# Patient Record
Sex: MALE | URBAN - METROPOLITAN AREA
[De-identification: names, ages, dates, MRNs, and addresses within clinical notes are randomized per-mention and may not be internally consistent; named-entity substitution may affect disease eponyms.]

---

## 2017-07-26 ENCOUNTER — APPOINTMENT (RX ONLY)
Dept: URBAN - METROPOLITAN AREA CLINIC 158 | Facility: CLINIC | Age: 47
Setting detail: DERMATOLOGY
End: 2017-07-26

## 2017-07-26 DIAGNOSIS — L29.89 OTHER PRURITUS: ICD-10-CM

## 2017-07-26 DIAGNOSIS — L30.2 CUTANEOUS AUTOSENSITIZATION: ICD-10-CM

## 2017-07-26 DIAGNOSIS — B35.3 TINEA PEDIS: ICD-10-CM

## 2017-07-26 PROBLEM — L29.8 OTHER PRURITUS: Status: ACTIVE | Noted: 2017-07-26

## 2017-07-26 PROCEDURE — 87220 TISSUE EXAM FOR FUNGI: CPT

## 2017-07-26 PROCEDURE — ? COUNSELING

## 2017-07-26 PROCEDURE — ? TREATMENT REGIMEN

## 2017-07-26 PROCEDURE — 99242 OFF/OP CONSLTJ NEW/EST SF 20: CPT

## 2017-07-26 PROCEDURE — ? PRESCRIPTION

## 2017-07-26 PROCEDURE — ? KOH PREP

## 2017-07-26 RX ORDER — HYDROXYZINE HYDROCHLORIDE 25 MG/1
TABLET, FILM COATED ORAL QHS
Qty: 60 | Refills: 5 | Status: ERX | COMMUNITY
Start: 2017-07-26

## 2017-07-26 RX ORDER — TRIAMCINOLONE ACETONIDE 1 MG/G
CREAM TOPICAL BID
Qty: 80 | Refills: 5 | Status: ERX | COMMUNITY
Start: 2017-07-26

## 2017-07-26 RX ORDER — KETOCONAZOLE 20 MG/G
CREAM TOPICAL BID
Qty: 60 | Refills: 1 | Status: ERX | COMMUNITY
Start: 2017-07-26

## 2017-07-26 RX ORDER — TERBINAFINE HCL 250 MG
TABLET ORAL
Qty: 14 | Refills: 0 | Status: ERX | COMMUNITY
Start: 2017-07-26

## 2017-07-26 RX ADMIN — Medication: at 15:35

## 2017-07-26 RX ADMIN — KETOCONAZOLE: 20 CREAM TOPICAL at 15:35

## 2017-07-26 RX ADMIN — TRIAMCINOLONE ACETONIDE: 1 CREAM TOPICAL at 15:36

## 2017-07-26 RX ADMIN — HYDROXYZINE HYDROCHLORIDE: 25 TABLET, FILM COATED ORAL at 15:37

## 2017-07-26 ASSESSMENT — LOCATION SIMPLE DESCRIPTION DERM
LOCATION SIMPLE: LEFT GREAT TOE
LOCATION SIMPLE: RIGHT FOOT
LOCATION SIMPLE: RIGHT FOREARM
LOCATION SIMPLE: LEFT FOOT
LOCATION SIMPLE: LEFT FOREARM

## 2017-07-26 ASSESSMENT — LOCATION ZONE DERM
LOCATION ZONE: TOE
LOCATION ZONE: ARM
LOCATION ZONE: FEET

## 2017-07-26 ASSESSMENT — LOCATION DETAILED DESCRIPTION DERM
LOCATION DETAILED: LEFT DORSAL FOOT
LOCATION DETAILED: LEFT VENTRAL PROXIMAL FOREARM
LOCATION DETAILED: LEFT PROXIMAL DORSAL FOREARM
LOCATION DETAILED: RIGHT VENTRAL PROXIMAL FOREARM
LOCATION DETAILED: LEFT LATERAL GREAT TOE
LOCATION DETAILED: 1ST WEBSPACE RIGHT FOOT
LOCATION DETAILED: RIGHT PROXIMAL RADIAL DORSAL FOREARM
LOCATION DETAILED: RIGHT DORSAL FOOT

## 2017-07-26 NOTE — PROCEDURE: KOH PREP
Koh Intro Text (From The.....): A KOH prep was ordered and evaluated from the
Detail Level: Detailed
Showing: budding yeast and branching hyphae

## 2017-07-26 NOTE — PROCEDURE: TREATMENT REGIMEN
Initiate Treatment: TAC 0.1% cream bid
Plan: Pt to f/u in 1 month for further eval and management
Detail Level: Zone
Plan: Pt to f/u in 1 months for further eval and management
Initiate Treatment: Terbinafine 250mg qday x 14 days, ketoconazole cream % bid
Continue Regimen: fexo 180mg( pt has at home), hydroxy 25mg qhs(1-2tabs at bedtime)
Plan: Pt to f/u in 1 month for further eval and management. If pt calls with no improvement another course of terbinafine can be sent in

## 2017-08-17 ENCOUNTER — RX ONLY (OUTPATIENT)
Age: 47
Setting detail: RX ONLY
End: 2017-08-17

## 2017-08-17 RX ORDER — TERBINAFINE HCL 250 MG
TABLET ORAL
Qty: 14 | Refills: 0 | Status: ERX

## 2017-09-06 ENCOUNTER — APPOINTMENT (RX ONLY)
Dept: URBAN - METROPOLITAN AREA CLINIC 158 | Facility: CLINIC | Age: 47
Setting detail: DERMATOLOGY
End: 2017-09-06

## 2017-09-06 DIAGNOSIS — L30.2 CUTANEOUS AUTOSENSITIZATION: ICD-10-CM | Status: RESOLVED

## 2017-09-06 DIAGNOSIS — L29.89 OTHER PRURITUS: ICD-10-CM | Status: IMPROVED

## 2017-09-06 DIAGNOSIS — R21 RASH AND OTHER NONSPECIFIC SKIN ERUPTION: ICD-10-CM | Status: INADEQUATELY CONTROLLED

## 2017-09-06 PROBLEM — L29.8 OTHER PRURITUS: Status: ACTIVE | Noted: 2017-09-06

## 2017-09-06 PROCEDURE — 11100: CPT

## 2017-09-06 PROCEDURE — 99213 OFFICE O/P EST LOW 20 MIN: CPT | Mod: 25

## 2017-09-06 PROCEDURE — ? KOH PREP

## 2017-09-06 PROCEDURE — 87220 TISSUE EXAM FOR FUNGI: CPT

## 2017-09-06 PROCEDURE — ? COUNSELING

## 2017-09-06 PROCEDURE — ? TREATMENT REGIMEN

## 2017-09-06 PROCEDURE — ? BIOPSY BY SHAVE METHOD

## 2017-09-06 ASSESSMENT — LOCATION DETAILED DESCRIPTION DERM
LOCATION DETAILED: LEFT VENTRAL PROXIMAL FOREARM
LOCATION DETAILED: LEFT DORSAL FOOT
LOCATION DETAILED: RIGHT VENTRAL PROXIMAL FOREARM
LOCATION DETAILED: LEFT LATERAL DORSAL FOOT

## 2017-09-06 ASSESSMENT — LOCATION ZONE DERM
LOCATION ZONE: FEET
LOCATION ZONE: ARM

## 2017-09-06 ASSESSMENT — LOCATION SIMPLE DESCRIPTION DERM
LOCATION SIMPLE: LEFT FOOT
LOCATION SIMPLE: LEFT FOREARM
LOCATION SIMPLE: RIGHT FOREARM

## 2017-09-06 NOTE — PROCEDURE: TREATMENT REGIMEN
Plan: Further POC after biopsy results are reveiwed.  Pt to f/u as needed until bx results are received.
Detail Level: Zone
Continue Regimen: ketoconazole cream
Discontinue Regimen: TAC cream- restart as needed for flaring
Continue Regimen: take fexofenadine 180mg po every morning and Hydroxyzine 25mg po at bedtime

## 2017-09-06 NOTE — PROCEDURE: BIOPSY BY SHAVE METHOD
Anesthesia Type: 1% lidocaine without epinephrine
Curettage Text: The wound bed was treated with curettage after the biopsy was performed.
Biopsy Method: Dermablade
Billing Type: Third-Party Bill
Consent: Written consent was obtained and risks were reviewed including but not limited to scarring, infection, bleeding, scabbing, incomplete removal, nerve damage and allergy to anesthesia.
Electrodesiccation And Curettage Text: The wound bed was treated with electrodesiccation and curettage after the biopsy was performed.
Additional Anesthesia Volume In Cc (Will Not Render If 0): 0
Render Post-Care Instructions In Note?: yes
Electrodesiccation Text: The wound bed was treated with electrodesiccation after the biopsy was performed.
Bill For Surgical Tray: no
Biopsy Type: H and E
Wound Care: Vaseline
Hemostasis: Richard's
Type Of Destruction Used: Curettage
Post-Care Instructions: I reviewed with the patient in detail post-care instructions. Patient is to keep the biopsy site dry overnight, and then apply bacitracin twice daily until healed. Patient may apply hydrogen peroxide soaks to remove any crusting.
Dressing: bandage
Detail Level: Detailed
Anesthesia Volume In Cc: 0.5
Cryotherapy Text: The wound bed was treated with cryotherapy after the biopsy was performed.
Silver Nitrate Text: The wound bed was treated with silver nitrate after the biopsy was performed.
Notification Instructions: Patient will be notified of biopsy results. However, patient instructed to call the office if not contacted within 2 weeks.

## 2017-09-15 ENCOUNTER — RX ONLY (OUTPATIENT)
Age: 47
Setting detail: RX ONLY
End: 2017-09-15

## 2017-09-15 RX ORDER — BETAMETHASONE DIPROPIONATE 0.5 MG/G
OINTMENT TOPICAL
Qty: 50 | Refills: 5 | Status: ERX | COMMUNITY
Start: 2017-09-15